# Patient Record
Sex: MALE | Race: ASIAN | URBAN - METROPOLITAN AREA
[De-identification: names, ages, dates, MRNs, and addresses within clinical notes are randomized per-mention and may not be internally consistent; named-entity substitution may affect disease eponyms.]

---

## 2017-01-16 ENCOUNTER — EMERGENCY (EMERGENCY)
Facility: HOSPITAL | Age: 47
LOS: 1 days | Discharge: HOME | End: 2017-01-16
Attending: EMERGENCY MEDICINE | Admitting: EMERGENCY MEDICINE
Payer: MEDICAID

## 2017-01-16 VITALS
TEMPERATURE: 98 F | HEART RATE: 75 BPM | DIASTOLIC BLOOD PRESSURE: 99 MMHG | RESPIRATION RATE: 15 BRPM | HEIGHT: 67 IN | SYSTOLIC BLOOD PRESSURE: 138 MMHG | OXYGEN SATURATION: 93 % | WEIGHT: 149.91 LBS

## 2017-01-16 DIAGNOSIS — R41.82 ALTERED MENTAL STATUS, UNSPECIFIED: ICD-10-CM

## 2017-01-16 LAB
ALBUMIN SERPL ELPH-MCNC: 3.2 G/DL — LOW (ref 3.4–5)
ALP SERPL-CCNC: 80 U/L — SIGNIFICANT CHANGE UP (ref 40–120)
ALT FLD-CCNC: 21 U/L — SIGNIFICANT CHANGE UP (ref 12–42)
ANION GAP SERPL CALC-SCNC: 9 MMOL/L — SIGNIFICANT CHANGE UP (ref 9–16)
APPEARANCE UR: CLEAR — SIGNIFICANT CHANGE UP
AST SERPL-CCNC: 22 U/L — SIGNIFICANT CHANGE UP (ref 15–37)
BILIRUB SERPL-MCNC: 0.3 MG/DL — SIGNIFICANT CHANGE UP (ref 0.2–1.2)
BILIRUB UR-MCNC: NEGATIVE — SIGNIFICANT CHANGE UP
BUN SERPL-MCNC: 11 MG/DL — SIGNIFICANT CHANGE UP (ref 7–23)
CALCIUM SERPL-MCNC: 8.3 MG/DL — LOW (ref 8.5–10.5)
CHLORIDE SERPL-SCNC: 105 MMOL/L — SIGNIFICANT CHANGE UP (ref 96–108)
CO2 SERPL-SCNC: 24 MMOL/L — SIGNIFICANT CHANGE UP (ref 22–31)
COLOR SPEC: YELLOW — SIGNIFICANT CHANGE UP
CREAT SERPL-MCNC: 1 MG/DL — SIGNIFICANT CHANGE UP (ref 0.5–1.3)
DIFF PNL FLD: (no result)
ETHANOL SERPL-MCNC: <3 MG/DL — SIGNIFICANT CHANGE UP
GLUCOSE SERPL-MCNC: 160 MG/DL — HIGH (ref 70–99)
GLUCOSE UR QL: NEGATIVE — SIGNIFICANT CHANGE UP
HCT VFR BLD CALC: 39.7 % — SIGNIFICANT CHANGE UP (ref 39–50)
HGB BLD-MCNC: 14.1 G/DL — SIGNIFICANT CHANGE UP (ref 13–17)
KETONES UR-MCNC: NEGATIVE — SIGNIFICANT CHANGE UP
LEUKOCYTE ESTERASE UR-ACNC: NEGATIVE — SIGNIFICANT CHANGE UP
MCHC RBC-ENTMCNC: 31.5 PG — SIGNIFICANT CHANGE UP (ref 27–34)
MCHC RBC-ENTMCNC: 35.5 G/DL — SIGNIFICANT CHANGE UP (ref 32–36)
MCV RBC AUTO: 88.8 FL — SIGNIFICANT CHANGE UP (ref 80–100)
NITRITE UR-MCNC: NEGATIVE — SIGNIFICANT CHANGE UP
PCP SPEC-MCNC: SIGNIFICANT CHANGE UP
PH UR: 6 — SIGNIFICANT CHANGE UP (ref 4–8.1)
PLATELET # BLD AUTO: 260 K/UL — SIGNIFICANT CHANGE UP (ref 150–400)
POTASSIUM SERPL-MCNC: 3.6 MMOL/L — SIGNIFICANT CHANGE UP (ref 3.5–5.3)
POTASSIUM SERPL-SCNC: 3.6 MMOL/L — SIGNIFICANT CHANGE UP (ref 3.5–5.3)
PROT SERPL-MCNC: 7.2 G/DL — SIGNIFICANT CHANGE UP (ref 6.4–8.2)
PROT UR-MCNC: NEGATIVE MG/DL — SIGNIFICANT CHANGE UP
RBC # BLD: 4.47 M/UL — SIGNIFICANT CHANGE UP (ref 4.2–5.8)
RBC # FLD: 11.7 % — SIGNIFICANT CHANGE UP (ref 10.3–16.9)
SODIUM SERPL-SCNC: 138 MMOL/L — SIGNIFICANT CHANGE UP (ref 132–145)
SP GR SPEC: >=1.03 — SIGNIFICANT CHANGE UP (ref 1–1.03)
UROBILINOGEN FLD QL: 0.2 E.U./DL — SIGNIFICANT CHANGE UP
WBC # BLD: 12.1 K/UL — HIGH (ref 3.8–10.5)
WBC # FLD AUTO: 12.1 K/UL — HIGH (ref 3.8–10.5)

## 2017-01-16 PROCEDURE — 99291 CRITICAL CARE FIRST HOUR: CPT | Mod: 25

## 2017-01-16 PROCEDURE — 70450 CT HEAD/BRAIN W/O DYE: CPT | Mod: 26

## 2017-01-16 PROCEDURE — 93010 ELECTROCARDIOGRAM REPORT: CPT

## 2017-01-16 PROCEDURE — 99053 MED SERV 10PM-8AM 24 HR FAC: CPT

## 2017-01-16 PROCEDURE — 71010: CPT | Mod: 26

## 2017-01-16 RX ORDER — SODIUM CHLORIDE 9 MG/ML
1000 INJECTION INTRAMUSCULAR; INTRAVENOUS; SUBCUTANEOUS ONCE
Qty: 0 | Refills: 0 | Status: COMPLETED | OUTPATIENT
Start: 2017-01-16 | End: 2017-01-16

## 2017-01-16 RX ADMIN — SODIUM CHLORIDE 1000 MILLILITER(S): 9 INJECTION INTRAMUSCULAR; INTRAVENOUS; SUBCUTANEOUS at 06:19

## 2017-01-16 NOTE — ED PROVIDER NOTE - CARE PLAN
Principal Discharge DX:	Altered mental status Principal Discharge DX:	Altered mental status  Secondary Diagnosis:	Polysubstance abuse

## 2017-01-16 NOTE — ED PROVIDER NOTE - MEDICAL DECISION MAKING DETAILS
Nasal airway insert and pt placed on O2 via NC. Labs/UA/tox/EKG/CXR/CT Head Nasal airway insert and pt placed on O2 via NC. Labs/UA/tox/EKG/CXR/CT Head.  Reassess.

## 2017-01-16 NOTE — ED ADULT NURSE NOTE - OBJECTIVE STATEMENT
received patient to rm 6 by ems responsive to pain only. patient attached to continuous cardiac monitoring. 18 left hand noted placed by ems. secondary iv inserted to right forearm 18G. fluids in progress and well tolerated.

## 2017-01-16 NOTE — ED ADULT TRIAGE NOTE - CHIEF COMPLAINT QUOTE
received patient by ems found in bathroom of club with bottle of ghb next to him as per medic. patient arrived with iv in left hand 18G. stuporous-no signs of obvious injury.

## 2017-01-16 NOTE — ED PROVIDER NOTE - CRITICAL CARE PROVIDED
additional history taking/interpretation of diagnostic studies/direct patient care (not related to procedure)/documentation

## 2017-01-16 NOTE — ED PROVIDER NOTE - PROGRESS NOTE DETAILS
signed out from night team pending sobriety, pt admits to crystal meth and GHB use, reassessed at bedside, mentating well and AxOx3, ambulatory with steady gait, will d/c

## 2017-01-16 NOTE — ED PROVIDER NOTE - OBJECTIVE STATEMENT
47 y/o M BIBA for AMS after being found unresponsive at a bath house allegedly with a bottle of GHB next to him 30 minutes ago. Remainder of Hx limited 2/2 AMS.

## 2023-10-04 NOTE — ED PROVIDER NOTE - INTERPRETATION
Problem: Balance  Goal: Balance- Goal transcribed from Care  Description: Balance: Established Date 29-Sep-2023  Balance: Goal Details Patient to improve dynamic standing balance to good in order to complete ADLs without LOB.  Balance: Time Frame for Goal 2 wks    Outcome: Progressing     Problem: Bathing  Goal: Bathing Upper Body- Goal transcribed from Care  Description: Bathing Upper Body: Established Date 29-Sep-2023  Bathing Upper Body: [place] Level Goal contact guard  Bathing Upper Body: Time Frame for Goal 2 wks    Outcome: Progressing  Goal: Bathing Lower Body- Goal transcribed from Care  Description: Bathing Lower Body: Established Date 29-Sep-2023  Bathing Lower Body: [place] Level Goal contact guard  Bathing Lower Body: Time Frame for Goal 2 wks    Outcome: Progressing     Problem: Dressings Lower Extremities  Goal: Lower Body Dressing- Goal transcribed from [facility]  Description: Lower Body Dressing: Established Date 29-Sep-2023  Lower Body Dressing: [place] Level Goal minimum assist (75% patients effort)  Lower Body Dressing: Assistive Device Goal dressing stick; reacher; sock-aid  Lower Body Dressing: Time Frame for Goal 2 wks    Outcome: Progressing     Problem: Dressing Upper Extremities  Goal: Upper Body Dressing- Goal transcribed from [facility]  Description: Upper Body Dressing: Established Date 29-Sep-2023  Upper Body Dressing: [place] Level Goal contact guard  Upper Body Dressing: Time Frame for Goal 2 wks  Outcome: Progressing     Problem: Grooming  Goal: Grooming- Goal transcribed from [facility]  Description: Grooming: Established Date 29-Sep-2023  Grooming: [place] Level Goal contact guard  Grooming: Time Frame for Goal 2 wks    Outcome: Progressing      normal sinus rhythm

## 2024-09-30 NOTE — ED PROVIDER NOTE - CAPILLARY REFILL
Pt wanted a call back regarding doctors recommendation/name of provider for service to spine referral.    less than 2 seconds